# Patient Record
Sex: FEMALE | Race: BLACK OR AFRICAN AMERICAN | NOT HISPANIC OR LATINO | Employment: OTHER | URBAN - METROPOLITAN AREA
[De-identification: names, ages, dates, MRNs, and addresses within clinical notes are randomized per-mention and may not be internally consistent; named-entity substitution may affect disease eponyms.]

---

## 2023-02-18 PROBLEM — E66.812 CLASS 2 OBESITY WITH BODY MASS INDEX (BMI) OF 37.0 TO 37.9 IN ADULT: Status: ACTIVE | Noted: 2023-02-18

## 2024-09-13 PROCEDURE — 80061 LIPID PANEL: CPT

## 2024-09-13 PROCEDURE — 85027 COMPLETE CBC AUTOMATED: CPT

## 2024-09-13 PROCEDURE — 80053 COMPREHEN METABOLIC PANEL: CPT

## 2024-09-13 PROCEDURE — 83036 HEMOGLOBIN GLYCOSYLATED A1C: CPT

## 2024-10-07 PROCEDURE — RXMED WILLOW AMBULATORY MEDICATION CHARGE

## 2024-10-09 ENCOUNTER — PHARMACY VISIT (OUTPATIENT)
Dept: PHARMACY | Facility: CLINIC | Age: 69
End: 2024-10-09
Payer: COMMERCIAL

## 2024-10-31 DIAGNOSIS — I10 BENIGN ESSENTIAL HYPERTENSION: ICD-10-CM

## 2024-11-01 RX ORDER — AMLODIPINE BESYLATE 5 MG/1
5 TABLET ORAL DAILY
Qty: 90 TABLET | Refills: 3 | Status: SHIPPED | OUTPATIENT
Start: 2024-11-01

## 2025-01-10 DIAGNOSIS — E78.2 HYPERLIPIDEMIA, MIXED: Primary | ICD-10-CM

## 2025-01-17 ENCOUNTER — TELEMEDICINE (OUTPATIENT)
Dept: PHARMACY | Facility: HOSPITAL | Age: 70
End: 2025-01-17
Payer: MEDICARE

## 2025-01-17 DIAGNOSIS — E78.2 HYPERLIPIDEMIA, MIXED: ICD-10-CM

## 2025-01-17 PROCEDURE — RXMED WILLOW AMBULATORY MEDICATION CHARGE

## 2025-01-17 RX ORDER — ALIROCUMAB 75 MG/ML
75 INJECTION, SOLUTION SUBCUTANEOUS
Qty: 6 ML | Refills: 3 | Status: SHIPPED | OUTPATIENT
Start: 2025-01-17

## 2025-01-17 NOTE — PROGRESS NOTES
Biotin   1 left    Subjective   Patient ID: Waleska Garland is a 69 y.o. female who presents for hyperlipidemia    Referring Provider: Lia Bui MD   - Last visit = 24  - Next visit = not yet scheduled    HPI    - Patient doing really well with Praluent so far; lipids very well controlled now!  - LDL = 77 24  - No adverse effects    PMH  - Gestational DM with son   - No personal history ASCVD    Family History  - Father  of MI in 40's  - Mother had stroke and MI;  in 80's  - Brother  of MI in 40's    Review of Systems    Medication System Management:  Affordability/Accessibility: getting assistance through  PAP for Praluent  Adherence/Organization: no issues  Adverse Effects: Previous severe leg pain with statins; none with Praluent     Objective     There were no vitals taken for this visit.     Labs  Lab Results   Component Value Date    BILITOT 0.5 2024    CALCIUM 10.6 2024    CO2 30 2024     2024    CREATININE 0.99 2024    GLUCOSE 96 2024    ALKPHOS 75 2024    K 5.0 2024    PROT 7.1 2024     2024    AST 11 2024    ALT 12 2024    BUN 13 2024    ANIONGAP 13 2024    ALBUMIN 4.1 2024    GFRF 59 (A) 2023     Lab Results   Component Value Date    TRIG 73 2024    CHOL 137 2024    LDLCALC 77 2024    HDL 45.6 2024     Lab Results   Component Value Date    HGBA1C 5.8 (H) 2024       Current Outpatient Medications on File Prior to Visit   Medication Sig Dispense Refill    alirocumab (Praluent Pen) 75 mg/mL pen injector Inject 75 mg under the skin once every 2 weeks. 6 mL 1    amLODIPine (Norvasc) 5 mg tablet TAKE 1 TABLET DAILY 90 tablet 3    cholecalciferol (Vitamin D-3) 25 MCG (1000 UT) tablet Take 1 tablet (25 mcg) by mouth once daily.      krill oil 500 mg capsule Take by mouth.      loratadine (Claritin) 10 mg tablet Take 1 tablet (10 mg) by mouth once  daily.      meclizine (Antivert) 25 mg tablet Take 1 tablet (25 mg) by mouth 3 times a day as needed for dizziness. 30 tablet 0    meloxicam (Mobic) 15 mg tablet Take by mouth.       No current facility-administered medications on file prior to visit.      PATIENT EDUCATION/DISCUSSION:  - Counseled patient on MOA, expectations, side effects, duration of therapy, contraindications, administration, and monitoring parameters  - Answered all patient questions and concerns    Assessment/Plan   Problem List Items Addressed This Visit    None  CONTINUE current pharmacotherapy   Send [facility] tax documents   CONTINUE making healthy diet and lifestyle choices   Provided patient with [facility] phone number for any additional questions or concerns [phone]  Follow up with Clinical Pharmacy Team 2/7/25 at 1:20 pm     Time spent with pt: Total length of time *** (minutes) of the encounter and more than 50% was spent counseling the patient.    Continue all meds under the continuation of care with the referring provider and clinical pharmacy team.    Please reach out to the Clinical Pharmacy Team if there are any further questions.     Verbal consent to manage patient's drug therapy was obtained from patient. They were informed they may decline to participate or withdraw from participation in pharmacy services at any time.    Dionne Brooke, PharmD  Clinical Pharmacy Specialist   [phone]

## 2025-01-21 ENCOUNTER — PHARMACY VISIT (OUTPATIENT)
Dept: PHARMACY | Facility: CLINIC | Age: 70
End: 2025-01-21
Payer: COMMERCIAL

## 2025-02-07 ENCOUNTER — APPOINTMENT (OUTPATIENT)
Dept: PHARMACY | Facility: HOSPITAL | Age: 70
End: 2025-02-07
Payer: MEDICARE

## 2025-02-13 ENCOUNTER — DOCUMENTATION (OUTPATIENT)
Dept: PRIMARY CARE | Facility: CLINIC | Age: 70
End: 2025-02-13
Payer: MEDICARE

## 2025-02-13 NOTE — PROGRESS NOTES
Clinical Pharmacy Appointment    Patient ID: Waleska Garland is a 69 y.o. female who presents for Hyperlipidemia.    Pt is here for Follow Up appointment.   Referring Provider: Lia Bui MD  PCP: Lia Bui MD, last visit: 9/13/24, next visit: not scheduled    Subjective   HPI  PMH significant for HLD, HTN, obesity.  Special needs/barriers to therapy: None identified    Medication System Management  [facility]raluent[facility]  Adherence/Organization: No current concerns  Affordability/Accessibility: No current concerns,  PAP approved for Praluent through 1/27/26    Drug Interactions  No relevant drug interactions were noted.    HYPERLIPIDEMIA:   Current lipid-lowering medications include:  Praluent 75mg subcutaneous once every 2 weeks  Previous medications: Atorvastatin, Rosuvastatin, Niacin, Pravastatin    Clarifications to above regimen: none  Adverse Effects: none    Lipid Panel Review  Lab Results   Component Value Date    TRIG 73 09/13/2024    CHOL 137 09/13/2024    LDLF 150 (H) 06/17/2023    LDLCALC 77 09/13/2024    HDL 45.6 09/13/2024     The 10-year ASCVD risk score (Brittni ROACH, et al., 2019) is: 8.1%    Values used to calculate the score:      Age: 69 years      Sex: Female      Is Non- : Yes      Diabetic: No      Tobacco smoker: No      Systolic Blood Pressure: 128 mmHg      Is BP treated: Yes      HDL Cholesterol: 45.6 mg/dL      Total Cholesterol: 137 mg/dL  Primary prevention, Intermediate risk (8.1%)  LDL Goal: < 100 mg/dL    Preventative Care  Immunizations Needed: RSV and Shingrix  Tobacco Use: non-smoker    Objective   Allergies   Allergen Reactions    Aspirin Other    Salicylates Unknown     Social History     Social History Narrative    Not on file      Medication Review  Current Outpatient Medications   Medication Instructions    amLODIPine (NORVASC) 5 mg, oral, Daily    BIOTIN ORAL 1 tablet, Daily    cholecalciferol (Vitamin  "D-3) 25 MCG (1000 UT) tablet 1 tablet, Daily    krill oil 500 mg capsule Take by mouth.    loratadine (CLARITIN) 10 mg, Daily    meclizine (ANTIVERT) 25 mg, oral, 3 times daily PRN    meloxicam (MOBIC) 15 mg, Daily PRN    Praluent Pen 75 mg, subcutaneous,  ONC Every 2 Weeks for 4 Weeks in a 28 Day Cycle      Vitals  BP Readings from Last 2 Encounters:   09/13/24 128/90   05/16/24 122/84     Labs  A1C  Lab Results   Component Value Date    HGBA1C 5.8 (H) 09/13/2024    HGBA1C 5.8 (A) 06/17/2023     BMP  Lab Results   Component Value Date    CALCIUM 10.6 09/13/2024     09/13/2024    K 5.0 09/13/2024    CO2 30 09/13/2024     09/13/2024    BUN 13 09/13/2024    CREATININE 0.99 09/13/2024    EGFR 62 09/13/2024     LFTs  Lab Results   Component Value Date    ALT 12 09/13/2024    AST 11 09/13/2024    ALKPHOS 75 09/13/2024    BILITOT 0.5 09/13/2024     FLP  Lab Results   Component Value Date    TRIG 73 09/13/2024    CHOL 137 09/13/2024    LDLF 150 (H) 06/17/2023    LDLCALC 77 09/13/2024    HDL 45.6 09/13/2024     Urine Microalbumin  No results found for: \"MICROALBCREA\"  Weight Management  Wt Readings from Last 3 Encounters:   09/13/24 90.7 kg (200 lb)   06/18/24 89.8 kg (197 lb 15.6 oz)   05/16/24 89.8 kg (198 lb)      There is no height or weight on file to calculate BMI.     Assessment/Plan     HYPERLIPIDEMIA:   Primary prevention, Intermediate risk (8.1%) with LDL goal < 100 mg/dL.   controlled with last clinic  and LDL 77 on 9/13/24.  Rationale for plan: Informed patient of approval for  PAP for Praluent through 1/27/26. Patient is tolerating Praluent well, she reported no adverse effects. Most recent LDL was at goal, 77.  Plan to continue current regimen. Follow up in ~1 year for  PAP renewal.     Medication Changes:  No Medication Changes     Patient Education:  Counseled patient on relevant medication mechanisms of action, expectations, side effects, duration of therapy, contraindications, " administration, and monitoring parameters.  All questions and concerns addressed. Contact pharmacist with any further questions or concerns prior to next appointment.    Clinical Pharmacist follow-up: ~1 year for  PAP renewal, Telehealth visit    Thank You,  Deborah Rainey, PharmD  PGY-1 Pharmacy Resident    Continue all meds under the continuation of care with the referring provider and clinical pharmacy team.  Verbal consent to manage patient's drug therapy was obtained from the patient. They were informed they may decline to participate or withdraw from participation in pharmacy services at any time.

## 2025-02-13 NOTE — PROGRESS NOTES
Patient identified for CCM program. Researched patient's chart for chronic care management. Will call patient to introduce the program and obtain consent for enrollment for chronic conditions.

## 2025-02-14 ENCOUNTER — TELEMEDICINE (OUTPATIENT)
Dept: PHARMACY | Facility: HOSPITAL | Age: 70
End: 2025-02-14
Payer: MEDICARE

## 2025-02-14 DIAGNOSIS — E78.2 HYPERLIPIDEMIA, MIXED: ICD-10-CM

## 2025-02-21 ENCOUNTER — DOCUMENTATION (OUTPATIENT)
Dept: PRIMARY CARE | Facility: CLINIC | Age: 70
End: 2025-02-21
Payer: MEDICARE

## 2025-02-21 NOTE — PROGRESS NOTES
Patient identified for CCM program. Two unsuccessful attempts were made to reach patient. Therefore, RN CM will close identification. Voicemail left with contact information for patient to call back with any non-emergent questions or concerns.

## 2025-03-20 PROCEDURE — RXMED WILLOW AMBULATORY MEDICATION CHARGE

## 2025-03-21 ENCOUNTER — PHARMACY VISIT (OUTPATIENT)
Dept: PHARMACY | Facility: CLINIC | Age: 70
End: 2025-03-21
Payer: COMMERCIAL

## 2025-05-30 PROCEDURE — RXMED WILLOW AMBULATORY MEDICATION CHARGE

## 2025-06-02 ENCOUNTER — PHARMACY VISIT (OUTPATIENT)
Dept: PHARMACY | Facility: CLINIC | Age: 70
End: 2025-06-02
Payer: COMMERCIAL

## 2025-08-15 ENCOUNTER — OFFICE VISIT (OUTPATIENT)
Dept: PRIMARY CARE | Facility: CLINIC | Age: 70
End: 2025-08-15
Payer: MEDICARE

## 2025-08-15 PROCEDURE — RXMED WILLOW AMBULATORY MEDICATION CHARGE

## 2025-08-15 ASSESSMENT — PAIN SCALES - GENERAL: PAINLEVEL_OUTOF10: 6

## 2025-08-21 ENCOUNTER — PHARMACY VISIT (OUTPATIENT)
Dept: PHARMACY | Facility: CLINIC | Age: 70
End: 2025-08-21
Payer: COMMERCIAL

## 2025-09-02 ENCOUNTER — TELEPHONE (OUTPATIENT)
Dept: PRIMARY CARE | Facility: CLINIC | Age: 70
End: 2025-09-02
Payer: MEDICARE

## 2025-09-04 ENCOUNTER — HOSPITAL ENCOUNTER (OUTPATIENT)
Dept: RADIOLOGY | Facility: CLINIC | Age: 70
Discharge: HOME | End: 2025-09-04
Payer: MEDICARE

## 2025-09-04 ENCOUNTER — OFFICE VISIT (OUTPATIENT)
Dept: URGENT CARE | Age: 70
End: 2025-09-04
Payer: MEDICARE

## 2025-09-04 VITALS
OXYGEN SATURATION: 95 % | RESPIRATION RATE: 20 BRPM | DIASTOLIC BLOOD PRESSURE: 92 MMHG | SYSTOLIC BLOOD PRESSURE: 146 MMHG | TEMPERATURE: 98.2 F | HEART RATE: 96 BPM

## 2025-09-04 DIAGNOSIS — B96.89 ACUTE BACTERIAL SINUSITIS: Primary | ICD-10-CM

## 2025-09-04 DIAGNOSIS — R12 HEARTBURN: Primary | ICD-10-CM

## 2025-09-04 DIAGNOSIS — H10.33 ACUTE BACTERIAL CONJUNCTIVITIS OF BOTH EYES: ICD-10-CM

## 2025-09-04 DIAGNOSIS — Z12.39 BREAST SCREENING: ICD-10-CM

## 2025-09-04 DIAGNOSIS — J01.90 ACUTE BACTERIAL SINUSITIS: Primary | ICD-10-CM

## 2025-09-04 DIAGNOSIS — Z12.31 ENCOUNTER FOR SCREENING MAMMOGRAM FOR MALIGNANT NEOPLASM OF BREAST: ICD-10-CM

## 2025-09-04 PROCEDURE — 77063 BREAST TOMOSYNTHESIS BI: CPT

## 2025-09-04 PROCEDURE — 77063 BREAST TOMOSYNTHESIS BI: CPT | Performed by: RADIOLOGY

## 2025-09-04 PROCEDURE — 77067 SCR MAMMO BI INCL CAD: CPT | Performed by: RADIOLOGY

## 2025-09-04 RX ORDER — FAMOTIDINE 20 MG/1
20 TABLET, FILM COATED ORAL 2 TIMES DAILY
Qty: 60 TABLET | Refills: 0 | Status: SHIPPED | OUTPATIENT
Start: 2025-09-04 | End: 2025-10-04

## 2025-09-04 RX ORDER — OMEPRAZOLE 40 MG/1
40 CAPSULE, DELAYED RELEASE ORAL
Qty: 30 CAPSULE | Refills: 0 | Status: SHIPPED | OUTPATIENT
Start: 2025-09-04 | End: 2025-10-04

## 2025-09-04 RX ORDER — ERYTHROMYCIN 5 MG/G
OINTMENT OPHTHALMIC NIGHTLY
Qty: 3 G | Refills: 0 | Status: SHIPPED | OUTPATIENT
Start: 2025-09-04 | End: 2025-09-11

## 2025-09-04 RX ORDER — AMOXICILLIN AND CLAVULANATE POTASSIUM 875; 125 MG/1; MG/1
875 TABLET, FILM COATED ORAL 2 TIMES DAILY
Qty: 20 TABLET | Refills: 0 | Status: SHIPPED | OUTPATIENT
Start: 2025-09-04

## 2025-09-04 ASSESSMENT — ENCOUNTER SYMPTOMS
EYE DISCHARGE: 1
EYE REDNESS: 1
COUGH: 1
SORE THROAT: 1
FATIGUE: 1

## 2025-09-04 ASSESSMENT — VISUAL ACUITY: OU: 1

## 2025-09-04 ASSESSMENT — PAIN SCALES - GENERAL: PAINLEVEL_OUTOF10: 6

## 2025-09-11 ENCOUNTER — APPOINTMENT (OUTPATIENT)
Dept: RADIOLOGY | Facility: CLINIC | Age: 70
End: 2025-09-11
Payer: MEDICARE

## 2025-09-15 ENCOUNTER — APPOINTMENT (OUTPATIENT)
Dept: PRIMARY CARE | Facility: CLINIC | Age: 70
End: 2025-09-15
Payer: MEDICARE